# Patient Record
Sex: FEMALE | Race: OTHER
[De-identification: names, ages, dates, MRNs, and addresses within clinical notes are randomized per-mention and may not be internally consistent; named-entity substitution may affect disease eponyms.]

---

## 2021-02-22 ENCOUNTER — FORM ENCOUNTER (OUTPATIENT)
Age: 66
End: 2021-02-22

## 2021-02-22 PROBLEM — Z00.00 ENCOUNTER FOR PREVENTIVE HEALTH EXAMINATION: Status: ACTIVE | Noted: 2021-02-22

## 2021-02-23 ENCOUNTER — APPOINTMENT (OUTPATIENT)
Dept: INTERNAL MEDICINE | Facility: CLINIC | Age: 66
End: 2021-02-23
Payer: COMMERCIAL

## 2021-02-23 DIAGNOSIS — E78.5 HYPERLIPIDEMIA, UNSPECIFIED: ICD-10-CM

## 2021-02-23 DIAGNOSIS — I10 ESSENTIAL (PRIMARY) HYPERTENSION: ICD-10-CM

## 2021-02-23 DIAGNOSIS — Z78.9 OTHER SPECIFIED HEALTH STATUS: ICD-10-CM

## 2021-02-23 DIAGNOSIS — U07.1 COVID-19: ICD-10-CM

## 2021-02-23 PROCEDURE — 99442: CPT

## 2021-02-23 RX ORDER — OMEPRAZOLE 20 MG/1
20 CAPSULE, DELAYED RELEASE ORAL
Refills: 0 | Status: ACTIVE | COMMUNITY

## 2021-02-23 RX ORDER — ATORVASTATIN CALCIUM 20 MG/1
20 TABLET, FILM COATED ORAL
Refills: 0 | Status: ACTIVE | COMMUNITY

## 2021-02-23 RX ORDER — LISINOPRIL 20 MG/1
20 TABLET ORAL
Refills: 0 | Status: ACTIVE | COMMUNITY

## 2021-02-25 ENCOUNTER — APPOINTMENT (OUTPATIENT)
Dept: DISASTER EMERGENCY | Facility: HOSPITAL | Age: 66
End: 2021-02-25

## 2021-02-25 ENCOUNTER — OUTPATIENT (OUTPATIENT)
Dept: OUTPATIENT SERVICES | Facility: HOSPITAL | Age: 66
LOS: 1 days | End: 2021-02-25
Payer: COMMERCIAL

## 2021-02-25 VITALS
OXYGEN SATURATION: 97 % | RESPIRATION RATE: 16 BRPM | DIASTOLIC BLOOD PRESSURE: 74 MMHG | SYSTOLIC BLOOD PRESSURE: 134 MMHG | TEMPERATURE: 99 F | HEART RATE: 53 BPM

## 2021-02-25 VITALS — DIASTOLIC BLOOD PRESSURE: 82 MMHG | SYSTOLIC BLOOD PRESSURE: 152 MMHG | TEMPERATURE: 99 F | HEART RATE: 71 BPM

## 2021-02-25 DIAGNOSIS — U07.1 COVID-19: ICD-10-CM

## 2021-02-25 PROCEDURE — M0239: CPT

## 2021-02-25 RX ORDER — SODIUM CHLORIDE 9 MG/ML
250 INJECTION INTRAMUSCULAR; INTRAVENOUS; SUBCUTANEOUS
Refills: 0 | Status: DISCONTINUED | OUTPATIENT
Start: 2021-02-25 | End: 2021-03-11

## 2021-02-25 RX ORDER — BAMLANIVIMAB 35 MG/ML
700 INJECTION, SOLUTION INTRAVENOUS ONCE
Refills: 0 | Status: COMPLETED | OUTPATIENT
Start: 2021-02-25 | End: 2021-02-25

## 2021-02-25 RX ADMIN — SODIUM CHLORIDE 25 MILLILITER(S): 9 INJECTION INTRAMUSCULAR; INTRAVENOUS; SUBCUTANEOUS at 11:33

## 2021-02-25 RX ADMIN — BAMLANIVIMAB 270 MILLIGRAM(S): 35 INJECTION, SOLUTION INTRAVENOUS at 11:33

## 2021-02-25 NOTE — CHART NOTE - NSCHARTNOTEFT_GEN_A_CORE
CC: Monoclonal Antibody Infusion/COVID 19 Positive    66yo F with PMH HTN p/w loss of smell and body aches since 2/18/21, found to be COVID positive on same day.  Now presents for monoclonal antibody infusion.      *COVID positive status verified by Clinical Call Center.     Vitals:  T(F): 98.9  HR: 71 bpm  BP: 152/82  RR:   SpO2:       PE:   Appearance: NAD	  Cardiovascular: RRR, Normal S1 S2, No murmurs  Respiratory: Lungs clear to auscultation b/l  Gastrointestinal:  Soft, Non-tender, Non-distended  Skin: warm and dry  Neurologic: A&Ox3, follows commands  Extremities: moves all extremities x4     ASSESSMENT:  Patient is a 65 year old F found to be COVID positive.  Patient referred for Monoclonal antibody infusion (Bamlanivimab) today 02/25/2021.    Symptoms/ Criteria: loss of smell and body aches  Risk Profile includes: age of 65, HTN    PLAN:  - Infusion procedure explained to patient   - Consent for monoclonal antibody infusion obtained and placed in patient's bedside chart  - Risk and benefits discussed with the patient and all questions were answered, patient agrees  - Infuse Bamlanivimab 700mg IV over one hour  - Check vital signs immediately prior to starting infusion and then every 15 minutes while infusion is running   - Observe patient for one hour post infusion    I have reviewed the Bamlanivimab Emergency Use Authorization (EUA) and I have provided the patient or patient's caregiver with the following information:    1. FDA has authorized emergency use Bamlanivimab, which is not an FDA-approved biological product.  2. The patient or patient's caregiver has the option to accept or refuse administration of Bamlanivimab.   3. The significant known and potential risks and benefits of Bamlanivimab and the extent to which such risks and benefits are unknown.  4. Information on available alternative treatments and risks and benefits of those alternatives.        ------------    Patient is cleared for discharge at ______ on 02/25/2021. She tolerated full infusion well with no immediate adverse reactions. Vital signs stable upon discharge. Patient is medically stable and cleared for discharge home. Discharge instructions provided to patient with fact sheet included. Patient was instructed to continue to self-isolate and use infection control measures (such as: wear mask, isolate, social distance, avoid sharing personal items, clean and disinfect “high touch” surfaces, and frequent handwashing) according to CDC guidelines. Patient instructed to follow up with PMD as needed. Patient advised to go to ER for any new/worsening SOB or trouble breathing, unable to tolerate food/fluid, or any other concerning symptoms. CC: Monoclonal Antibody Infusion/COVID 19 Positive    66yo F with PMH HTN p/w loss of smell and body aches since 2/18/21, found to be COVID positive on same day.  Now presents for monoclonal antibody infusion.  Patient is Citizen of [place]-speaking, requesting translation by  (also a patient at [facility]).      *COVID positive status verified by Clinical Call Center.     Vitals:  T(F): 98.9  HR: 71 bpm  BP: 152/82  RR: 18  SpO2: 98% on RA      PE:   Appearance: NAD	  Cardiovascular: RRR, Normal S1 S2, No murmurs  Respiratory: Lungs clear to auscultation b/l  Gastrointestinal:  Soft, Non-tender, Non-distended  Skin: warm and dry  Neurologic: A&Ox3, follows commands  Extremities: moves all extremities x4     ASSESSMENT:  Patient is a 65 year old F found to be COVID positive.  Patient referred for Monoclonal antibody infusion (Bamlanivimab) today 02/25/2021.    Symptoms/ Criteria: loss of smell and body aches  Risk Profile includes: age of 65, HTN    PLAN:  - Infusion procedure explained to patient   - Consent for monoclonal antibody infusion obtained and placed in patient's bedside chart  - Risk and benefits discussed with the patient and all questions were answered, patient agrees  - Infuse Bamlanivimab 700mg IV over one hour  - Check vital signs immediately prior to starting infusion and then every 15 minutes while infusion is running   - Observe patient for one hour post infusion    I have reviewed the Bamlanivimab Emergency Use Authorization (EUA) and I have provided the patient or patient's caregiver with the following information:    1. FDA has authorized emergency use Bamlanivimab, which is not an FDA-approved biological product.  2. The patient or patient's caregiver has the option to accept or refuse administration of Bamlanivimab.   3. The significant known and potential risks and benefits of Bamlanivimab and the extent to which such risks and benefits are unknown.  4. Information on available alternative treatments and risks and benefits of those alternatives.        ------------    Patient is cleared for discharge at ______ on 02/25/2021. She tolerated full infusion well with no immediate adverse reactions. Vital signs stable upon discharge. Patient is medically stable and cleared for discharge home. Discharge instructions provided to patient with fact sheet included. Patient was instructed to continue to self-isolate and use infection control measures (such as: wear mask, isolate, social distance, avoid sharing personal items, clean and disinfect “high touch” surfaces, and frequent handwashing) according to CDC guidelines. Patient instructed to follow up with PMD as needed. Patient advised to go to ER for any new/worsening SOB or trouble breathing, unable to tolerate food/fluid, or any other concerning symptoms. CC: Monoclonal Antibody Infusion/COVID 19 Positive    64yo F with PMH HTN p/w loss of smell and body aches since 2/18/21, found to be COVID positive on same day.  Now presents for monoclonal antibody infusion.  Patient is [place]-speaking, requesting translation by  (also a patient at [facility] tent today).      *COVID positive status verified by Clinical Call Center.     Vitals:  T(F): 98.9  HR: 71 bpm  BP: 152/82  RR: 18  SpO2: 98% on RA      PE:   Appearance: NAD	  Cardiovascular: RRR, Normal S1 S2, No murmurs  Respiratory: Lungs clear to auscultation b/l  Gastrointestinal:  Soft, Non-tender, Non-distended  Skin: warm and dry  Neurologic: A&Ox3, follows commands  Extremities: moves all extremities x4     ASSESSMENT:  Patient is a 65 year old F found to be COVID positive.  Patient referred for Monoclonal antibody infusion (Bamlanivimab) today 02/25/2021.    Symptoms/ Criteria: loss of smell and body aches  Risk Profile includes: age of 65, HTN    PLAN:  - Infusion procedure explained to patient   - Consent for monoclonal antibody infusion obtained and placed in patient's bedside chart  - Risk and benefits discussed with the patient and all questions were answered, patient agrees  - Infuse Bamlanivimab 700mg IV over one hour  - Check vital signs immediately prior to starting infusion and then every 15 minutes while infusion is running   - Observe patient for one hour post infusion    I have reviewed the Bamlanivimab Emergency Use Authorization (EUA) and I have provided the patient or patient's caregiver with the following information:    1. FDA has authorized emergency use Bamlanivimab, which is not an FDA-approved biological product.  2. The patient or patient's caregiver has the option to accept or refuse administration of Bamlanivimab.   3. The significant known and potential risks and benefits of Bamlanivimab and the extent to which such risks and benefits are unknown.  4. Information on available alternative treatments and risks and benefits of those alternatives.        ------------    Patient is cleared for discharge at 13:30 on 02/25/2021. She tolerated full infusion well with no immediate adverse reactions. Vital signs stable upon discharge. Patient is medically stable and cleared for discharge home. Discharge instructions provided to patient with fact sheet included. Patient was instructed to continue to self-isolate and use infection control measures (such as: wear mask, isolate, social distance, avoid sharing personal items, clean and disinfect “high touch” surfaces, and frequent handwashing) according to CDC guidelines. Patient instructed to follow up with PMD as needed. Patient advised to go to ER for any new/worsening SOB or trouble breathing, unable to tolerate food/fluid, or any other concerning symptoms.

## 2021-02-26 ENCOUNTER — TRANSCRIPTION ENCOUNTER (OUTPATIENT)
Age: 66
End: 2021-02-26

## 2021-03-02 ENCOUNTER — TRANSCRIPTION ENCOUNTER (OUTPATIENT)
Age: 66
End: 2021-03-02

## 2021-07-03 ENCOUNTER — RESULT REVIEW (OUTPATIENT)
Age: 66
End: 2021-07-03

## 2024-10-31 ENCOUNTER — DOCTOR'S OFFICE (OUTPATIENT)
Facility: LOCATION | Age: 69
Setting detail: OPHTHALMOLOGY
End: 2024-10-31
Payer: COMMERCIAL

## 2024-10-31 VITALS — HEIGHT: 55 IN

## 2024-10-31 DIAGNOSIS — H02.31: ICD-10-CM

## 2024-10-31 DIAGNOSIS — H25.13: ICD-10-CM

## 2024-10-31 DIAGNOSIS — H11.042: ICD-10-CM

## 2024-10-31 DIAGNOSIS — H01.001: ICD-10-CM

## 2024-10-31 DIAGNOSIS — H01.004: ICD-10-CM

## 2024-10-31 DIAGNOSIS — H11.153: ICD-10-CM

## 2024-10-31 DIAGNOSIS — H02.403: ICD-10-CM

## 2024-10-31 DIAGNOSIS — H02.34: ICD-10-CM

## 2024-10-31 PROCEDURE — 92014 COMPRE OPH EXAM EST PT 1/>: CPT | Performed by: OPHTHALMOLOGY

## 2024-10-31 ASSESSMENT — REFRACTION_CURRENTRX
OD_CYLINDER: 0.00
OD_ADD: +3.00
OD_OVR_VA: 20/
OD_VPRISM_DIRECTION: BF
OS_VPRISM_DIRECTION: BF
OD_SPHERE: +0.75
OS_SPHERE: +1.00
OS_AXIS: 153
OS_CYLINDER: +0.50
OD_AXIS: 00
OS_ADD: +3.00
OS_OVR_VA: 20/

## 2024-10-31 ASSESSMENT — REFRACTION_MANIFEST
OD_CYLINDER: +0.50
OD_SPHERE: +1.25
OS_SPHERE: +1.00
OS_CYLINDER: SPHERE
OS_VA1: 20/25
OD_VA1: 20/25
OD_AXIS: 055
OS_ADD: +3.00
OD_ADD: +3.00

## 2024-10-31 ASSESSMENT — CONFRONTATIONAL VISUAL FIELD TEST (CVF)
OD_FINDINGS: FULL
OS_FINDINGS: FULL

## 2024-10-31 ASSESSMENT — TONOMETRY
OD_IOP_MMHG: 21
OS_IOP_MMHG: 21

## 2024-10-31 ASSESSMENT — VISUAL ACUITY
OS_BCVA: 20/40+2
OD_BCVA: 20/30